# Patient Record
Sex: FEMALE | Race: WHITE | Employment: OTHER | URBAN - NONMETROPOLITAN AREA
[De-identification: names, ages, dates, MRNs, and addresses within clinical notes are randomized per-mention and may not be internally consistent; named-entity substitution may affect disease eponyms.]

---

## 2018-05-21 ENCOUNTER — OFFICE VISIT (OUTPATIENT)
Dept: PULMONOLOGY | Age: 73
End: 2018-05-21
Payer: MEDICARE

## 2018-05-21 VITALS
RESPIRATION RATE: 15 BRPM | DIASTOLIC BLOOD PRESSURE: 66 MMHG | OXYGEN SATURATION: 94 % | WEIGHT: 213.2 LBS | SYSTOLIC BLOOD PRESSURE: 102 MMHG | BODY MASS INDEX: 33.46 KG/M2 | HEIGHT: 67 IN | TEMPERATURE: 97.8 F | HEART RATE: 68 BPM

## 2018-05-21 DIAGNOSIS — J96.11 CHRONIC HYPOXEMIC RESPIRATORY FAILURE (HCC): ICD-10-CM

## 2018-05-21 DIAGNOSIS — J44.9 STAGE 3 SEVERE COPD BY GOLD CLASSIFICATION (HCC): Primary | ICD-10-CM

## 2018-05-21 DIAGNOSIS — R91.1 LUNG NODULE: ICD-10-CM

## 2018-05-21 PROCEDURE — G8427 DOCREV CUR MEDS BY ELIG CLIN: HCPCS | Performed by: INTERNAL MEDICINE

## 2018-05-21 PROCEDURE — 94618 PULMONARY STRESS TESTING: CPT | Performed by: INTERNAL MEDICINE

## 2018-05-21 PROCEDURE — 4040F PNEUMOC VAC/ADMIN/RCVD: CPT | Performed by: INTERNAL MEDICINE

## 2018-05-21 PROCEDURE — 3017F COLORECTAL CA SCREEN DOC REV: CPT | Performed by: INTERNAL MEDICINE

## 2018-05-21 PROCEDURE — 99204 OFFICE O/P NEW MOD 45 MIN: CPT | Performed by: INTERNAL MEDICINE

## 2018-05-21 PROCEDURE — 1123F ACP DISCUSS/DSCN MKR DOCD: CPT | Performed by: INTERNAL MEDICINE

## 2018-05-21 PROCEDURE — G8417 CALC BMI ABV UP PARAM F/U: HCPCS | Performed by: INTERNAL MEDICINE

## 2018-05-21 PROCEDURE — G8926 SPIRO NO PERF OR DOC: HCPCS | Performed by: INTERNAL MEDICINE

## 2018-05-21 PROCEDURE — 1036F TOBACCO NON-USER: CPT | Performed by: INTERNAL MEDICINE

## 2018-05-21 PROCEDURE — 3023F SPIROM DOC REV: CPT | Performed by: INTERNAL MEDICINE

## 2018-05-21 PROCEDURE — 1090F PRES/ABSN URINE INCON ASSESS: CPT | Performed by: INTERNAL MEDICINE

## 2018-05-21 PROCEDURE — G8400 PT W/DXA NO RESULTS DOC: HCPCS | Performed by: INTERNAL MEDICINE

## 2018-05-21 RX ORDER — ALBUTEROL SULFATE 1.25 MG/3ML
1 SOLUTION RESPIRATORY (INHALATION) EVERY 6 HOURS PRN
Qty: 360 ML | Refills: 11 | Status: SHIPPED | OUTPATIENT
Start: 2018-05-21 | End: 2019-06-03 | Stop reason: ALTCHOICE

## 2018-05-21 RX ORDER — ALBUTEROL SULFATE 1.25 MG/3ML
1 SOLUTION RESPIRATORY (INHALATION) EVERY 6 HOURS PRN
COMMUNITY
End: 2018-05-21 | Stop reason: SDUPTHER

## 2018-05-21 RX ORDER — ATORVASTATIN CALCIUM 40 MG/1
40 TABLET, FILM COATED ORAL DAILY
COMMUNITY
End: 2020-07-15

## 2018-05-21 RX ORDER — RAMIPRIL 10 MG/1
10 CAPSULE ORAL DAILY
COMMUNITY

## 2018-05-21 ASSESSMENT — ENCOUNTER SYMPTOMS
PHOTOPHOBIA: 0
SINUS PRESSURE: 0
CONSTIPATION: 0
RHINORRHEA: 0
VOICE CHANGE: 0
FACIAL SWELLING: 0
COUGH: 0
SHORTNESS OF BREATH: 1
VOMITING: 0
STRIDOR: 0
CHEST TIGHTNESS: 0
ABDOMINAL PAIN: 0
WHEEZING: 0
ABDOMINAL DISTENTION: 0
BACK PAIN: 0
APNEA: 0
BLOOD IN STOOL: 0
CHOKING: 0
NAUSEA: 0

## 2018-05-24 DIAGNOSIS — R91.1 LUNG NODULE: ICD-10-CM

## 2018-05-24 DIAGNOSIS — J44.9 STAGE 3 SEVERE COPD BY GOLD CLASSIFICATION (HCC): ICD-10-CM

## 2018-05-31 ENCOUNTER — TELEPHONE (OUTPATIENT)
Dept: PULMONOLOGY | Age: 73
End: 2018-05-31

## 2018-06-07 ENCOUNTER — TELEPHONE (OUTPATIENT)
Dept: PULMONOLOGY | Age: 73
End: 2018-06-07

## 2018-06-07 DIAGNOSIS — J44.9 STAGE 3 SEVERE COPD BY GOLD CLASSIFICATION (HCC): ICD-10-CM

## 2018-06-13 ENCOUNTER — TELEPHONE (OUTPATIENT)
Dept: PULMONOLOGY | Age: 73
End: 2018-06-13

## 2018-06-13 NOTE — TELEPHONE ENCOUNTER
Pt called and is mad. She never received my message on the Duoneb, she has been on this for years with her Spiriva and Sada Taylor will not ship the Albuterol beause of her HX. She will be out and says will end up in hospital.  Please advise.  I am also calling Sada Taylor

## 2018-06-26 NOTE — TELEPHONE ENCOUNTER
Called pt to make sure she received Albuterol and that she is doing ok. Received and has used for a day. So far she is good and josé miguel call if any problems.

## 2018-07-11 ENCOUNTER — TELEPHONE (OUTPATIENT)
Dept: PULMONOLOGY | Age: 73
End: 2018-07-11

## 2018-07-11 NOTE — TELEPHONE ENCOUNTER
Pt called and has been using the Albuterol nebulizer soulution. She had always been on Ipratropium/Albuterol for years and since making this change is breathless. Cannot even walk to bathroom from sewing room without being out of breath,. Never had this problem when on Duoneb. .  Wants to go back to it. Has NEVER been like this before and has lost 30 in 3 months. She uses [facility] for this medication. Please reconsider giving this medicine to her.

## 2018-07-12 DIAGNOSIS — J44.9 CHRONIC OBSTRUCTIVE PULMONARY DISEASE, UNSPECIFIED COPD TYPE (HCC): Primary | ICD-10-CM

## 2018-07-12 RX ORDER — IPRATROPIUM BROMIDE AND ALBUTEROL SULFATE 2.5; .5 MG/3ML; MG/3ML
1 SOLUTION RESPIRATORY (INHALATION) EVERY 4 HOURS PRN
Qty: 360 VIAL | Refills: 5 | Status: SHIPPED | OUTPATIENT
Start: 2018-07-12 | End: 2019-06-03

## 2018-08-16 ENCOUNTER — TELEPHONE (OUTPATIENT)
Dept: PULMONOLOGY | Age: 73
End: 2018-08-16

## 2019-06-03 ENCOUNTER — OFFICE VISIT (OUTPATIENT)
Dept: PULMONOLOGY | Age: 74
End: 2019-06-03
Payer: MEDICARE

## 2019-06-03 VITALS
TEMPERATURE: 97.8 F | WEIGHT: 209.6 LBS | HEIGHT: 67 IN | HEART RATE: 58 BPM | SYSTOLIC BLOOD PRESSURE: 111 MMHG | RESPIRATION RATE: 18 BRPM | BODY MASS INDEX: 32.9 KG/M2 | DIASTOLIC BLOOD PRESSURE: 70 MMHG | OXYGEN SATURATION: 93 %

## 2019-06-03 DIAGNOSIS — R06.02 SOB (SHORTNESS OF BREATH): ICD-10-CM

## 2019-06-03 DIAGNOSIS — J96.11 CHRONIC RESPIRATORY FAILURE WITH HYPOXIA (HCC): ICD-10-CM

## 2019-06-03 DIAGNOSIS — J44.9 STAGE 3 SEVERE COPD BY GOLD CLASSIFICATION (HCC): Primary | ICD-10-CM

## 2019-06-03 PROCEDURE — 4040F PNEUMOC VAC/ADMIN/RCVD: CPT | Performed by: NURSE PRACTITIONER

## 2019-06-03 PROCEDURE — G8427 DOCREV CUR MEDS BY ELIG CLIN: HCPCS | Performed by: NURSE PRACTITIONER

## 2019-06-03 PROCEDURE — 1090F PRES/ABSN URINE INCON ASSESS: CPT | Performed by: NURSE PRACTITIONER

## 2019-06-03 PROCEDURE — 1123F ACP DISCUSS/DSCN MKR DOCD: CPT | Performed by: NURSE PRACTITIONER

## 2019-06-03 PROCEDURE — 94618 PULMONARY STRESS TESTING: CPT | Performed by: NURSE PRACTITIONER

## 2019-06-03 PROCEDURE — 3023F SPIROM DOC REV: CPT | Performed by: NURSE PRACTITIONER

## 2019-06-03 PROCEDURE — G8417 CALC BMI ABV UP PARAM F/U: HCPCS | Performed by: NURSE PRACTITIONER

## 2019-06-03 PROCEDURE — G8926 SPIRO NO PERF OR DOC: HCPCS | Performed by: NURSE PRACTITIONER

## 2019-06-03 PROCEDURE — 3017F COLORECTAL CA SCREEN DOC REV: CPT | Performed by: NURSE PRACTITIONER

## 2019-06-03 PROCEDURE — G8400 PT W/DXA NO RESULTS DOC: HCPCS | Performed by: NURSE PRACTITIONER

## 2019-06-03 PROCEDURE — 1036F TOBACCO NON-USER: CPT | Performed by: NURSE PRACTITIONER

## 2019-06-03 PROCEDURE — 99214 OFFICE O/P EST MOD 30 MIN: CPT | Performed by: NURSE PRACTITIONER

## 2019-06-03 RX ORDER — ALBUTEROL SULFATE 90 UG/1
2 AEROSOL, METERED RESPIRATORY (INHALATION) 4 TIMES DAILY PRN
Qty: 1 INHALER | Refills: 11 | Status: SHIPPED | OUTPATIENT
Start: 2019-06-03 | End: 2019-09-11 | Stop reason: SDUPTHER

## 2019-06-03 RX ORDER — ALBUTEROL SULFATE 2.5 MG/3ML
2.5 SOLUTION RESPIRATORY (INHALATION) EVERY 6 HOURS PRN
Qty: 360 VIAL | Refills: 11 | Status: SHIPPED | OUTPATIENT
Start: 2019-06-03 | End: 2019-09-11

## 2019-06-03 NOTE — PROGRESS NOTES
[facility]    Patient: Ted Alonso, 69 y.o.   : 1945  6/3/2019    Pt of Dr. Soledad Newell   Patient presents with    Follow-up     1 year COPD follow up. SHIVA Zaidi is here for 1 year follow up for severe COPD, FEV1 37% in 2017. She has a 35 pack year smoking history quitting in  along with factory work environmental exposures. She followed with Dr. Bob Dumas for years. She is on Advair, Spiriva and is using DuoNeb 2 times daily (at her request as she had no benefit with Albuterol alone). She is to be using home 02 at 4 Liters with activity and sleep. Uses 2 Liters with sleep and exertional activity only. Was treated for CAP in April while residing in [place]. Reports having a CXR and CT of chest at that time. CT was done to follow up on possible nodule vs scarring and was told not concerning (no reports). Previous CXR's done at [facility] questioned nodule as well, last repeat a year ago reported no nodules (no films). Presents feeling more GARCÍA over the last 6 months. Also has a history of MI years ago, no stenting. Has not followed with Dr. Mateus Romeo and is due to see next month. Lost 50 lbs before going to [place] for winter, gained 20 lbs back. SOB did not improve with weight loss. Reports having normal sleep study in the past. Son and grandson both with asthma. .      Progress History:   Since last visit any new medical issues? No  New ER or hospitlal visits? No  Any new or changes in medicines? No  Using inhalers? Yes Spiriva and Advair  Are they helpful? Not sure, has been on for years.   Past Medical hx   PMH:  Past Medical History:   Diagnosis Date    COPD (chronic obstructive pulmonary disease) ([address]     HLD (hyperlipidemia)     HTN (hypertension)     Myocardial infarction ([address]      SURGICAL HISTORY:  Past Surgical History:   Procedure Laterality Date    BLADDER SUSPENSION      CATARACT REMOVAL     

## 2019-09-03 ENCOUNTER — HOSPITAL ENCOUNTER (OUTPATIENT)
Dept: PULMONOLOGY | Age: 74
Discharge: HOME | End: 2019-09-03
Payer: MEDICARE

## 2019-09-03 DIAGNOSIS — J44.9 STAGE 3 SEVERE COPD BY GOLD CLASSIFICATION (HCC): ICD-10-CM

## 2019-09-03 PROCEDURE — 94060 EVALUATION OF WHEEZING: CPT

## 2019-09-11 ENCOUNTER — OFFICE VISIT (OUTPATIENT)
Dept: PULMONOLOGY | Age: 74
End: 2019-09-11
Payer: MEDICARE

## 2019-09-11 ENCOUNTER — TELEPHONE (OUTPATIENT)
Dept: PULMONOLOGY | Age: 74
End: 2019-09-11

## 2019-09-11 VITALS
HEIGHT: 66 IN | TEMPERATURE: 98.9 F | BODY MASS INDEX: 34.72 KG/M2 | DIASTOLIC BLOOD PRESSURE: 76 MMHG | OXYGEN SATURATION: 94 % | SYSTOLIC BLOOD PRESSURE: 118 MMHG | HEART RATE: 71 BPM | WEIGHT: 216 LBS

## 2019-09-11 DIAGNOSIS — J96.11 CHRONIC RESPIRATORY FAILURE WITH HYPOXIA (HCC): ICD-10-CM

## 2019-09-11 DIAGNOSIS — J84.9 ILD (INTERSTITIAL LUNG DISEASE) (HCC): ICD-10-CM

## 2019-09-11 DIAGNOSIS — J44.9 STAGE 3 SEVERE COPD BY GOLD CLASSIFICATION (HCC): Primary | ICD-10-CM

## 2019-09-11 DIAGNOSIS — R06.02 SOB (SHORTNESS OF BREATH): ICD-10-CM

## 2019-09-11 DIAGNOSIS — I25.2 HISTORY OF MI (MYOCARDIAL INFARCTION): ICD-10-CM

## 2019-09-11 PROCEDURE — G8400 PT W/DXA NO RESULTS DOC: HCPCS | Performed by: NURSE PRACTITIONER

## 2019-09-11 PROCEDURE — 3023F SPIROM DOC REV: CPT | Performed by: NURSE PRACTITIONER

## 2019-09-11 PROCEDURE — G8427 DOCREV CUR MEDS BY ELIG CLIN: HCPCS | Performed by: NURSE PRACTITIONER

## 2019-09-11 PROCEDURE — G8417 CALC BMI ABV UP PARAM F/U: HCPCS | Performed by: NURSE PRACTITIONER

## 2019-09-11 PROCEDURE — 4040F PNEUMOC VAC/ADMIN/RCVD: CPT | Performed by: NURSE PRACTITIONER

## 2019-09-11 PROCEDURE — 1090F PRES/ABSN URINE INCON ASSESS: CPT | Performed by: NURSE PRACTITIONER

## 2019-09-11 PROCEDURE — G8926 SPIRO NO PERF OR DOC: HCPCS | Performed by: NURSE PRACTITIONER

## 2019-09-11 PROCEDURE — 3017F COLORECTAL CA SCREEN DOC REV: CPT | Performed by: NURSE PRACTITIONER

## 2019-09-11 PROCEDURE — 1036F TOBACCO NON-USER: CPT | Performed by: NURSE PRACTITIONER

## 2019-09-11 PROCEDURE — 99214 OFFICE O/P EST MOD 30 MIN: CPT | Performed by: NURSE PRACTITIONER

## 2019-09-11 PROCEDURE — 1123F ACP DISCUSS/DSCN MKR DOCD: CPT | Performed by: NURSE PRACTITIONER

## 2019-09-11 RX ORDER — IPRATROPIUM BROMIDE AND ALBUTEROL SULFATE 2.5; .5 MG/3ML; MG/3ML
1 SOLUTION RESPIRATORY (INHALATION) EVERY 4 HOURS
Qty: 360 ML | Refills: 11 | Status: SHIPPED | OUTPATIENT
Start: 2019-09-11 | End: 2019-09-12 | Stop reason: SDUPTHER

## 2019-09-11 RX ORDER — ALBUTEROL SULFATE 90 UG/1
2 AEROSOL, METERED RESPIRATORY (INHALATION) 4 TIMES DAILY PRN
Qty: 1 INHALER | Refills: 11 | Status: SHIPPED | OUTPATIENT
Start: 2019-09-11

## 2019-09-11 NOTE — TELEPHONE ENCOUNTER
Eloisa called from [facility] and Pts script for Duoneb that was handwritten says every 6 hrs prn. Medicare and has to be 4 hours but no PRN, she has one on file good until June from  Dr Nicky Blackman if this is oK?

## 2019-09-12 RX ORDER — IPRATROPIUM BROMIDE AND ALBUTEROL SULFATE 2.5; .5 MG/3ML; MG/3ML
1 SOLUTION RESPIRATORY (INHALATION) 4 TIMES DAILY
Qty: 360 ML | Refills: 11 | Status: SHIPPED | OUTPATIENT
Start: 2019-09-12 | End: 2020-07-15 | Stop reason: ALTCHOICE

## 2019-09-13 DIAGNOSIS — R06.02 SOB (SHORTNESS OF BREATH): ICD-10-CM

## 2019-09-13 DIAGNOSIS — I25.2 HISTORY OF MI (MYOCARDIAL INFARCTION): ICD-10-CM

## 2019-09-13 DIAGNOSIS — J44.9 STAGE 3 SEVERE COPD BY GOLD CLASSIFICATION (HCC): ICD-10-CM

## 2020-07-15 ENCOUNTER — OFFICE VISIT (OUTPATIENT)
Dept: PULMONOLOGY | Age: 75
End: 2020-07-15
Payer: MEDICARE

## 2020-07-15 VITALS
OXYGEN SATURATION: 93 % | HEIGHT: 66 IN | SYSTOLIC BLOOD PRESSURE: 118 MMHG | WEIGHT: 220.2 LBS | BODY MASS INDEX: 35.39 KG/M2 | DIASTOLIC BLOOD PRESSURE: 68 MMHG | TEMPERATURE: 97.3 F | HEART RATE: 68 BPM

## 2020-07-15 PROCEDURE — G8427 DOCREV CUR MEDS BY ELIG CLIN: HCPCS | Performed by: NURSE PRACTITIONER

## 2020-07-15 PROCEDURE — 1123F ACP DISCUSS/DSCN MKR DOCD: CPT | Performed by: NURSE PRACTITIONER

## 2020-07-15 PROCEDURE — G8926 SPIRO NO PERF OR DOC: HCPCS | Performed by: NURSE PRACTITIONER

## 2020-07-15 PROCEDURE — 1036F TOBACCO NON-USER: CPT | Performed by: NURSE PRACTITIONER

## 2020-07-15 PROCEDURE — G8400 PT W/DXA NO RESULTS DOC: HCPCS | Performed by: NURSE PRACTITIONER

## 2020-07-15 PROCEDURE — G8417 CALC BMI ABV UP PARAM F/U: HCPCS | Performed by: NURSE PRACTITIONER

## 2020-07-15 PROCEDURE — 99214 OFFICE O/P EST MOD 30 MIN: CPT | Performed by: NURSE PRACTITIONER

## 2020-07-15 PROCEDURE — 4040F PNEUMOC VAC/ADMIN/RCVD: CPT | Performed by: NURSE PRACTITIONER

## 2020-07-15 PROCEDURE — 1090F PRES/ABSN URINE INCON ASSESS: CPT | Performed by: NURSE PRACTITIONER

## 2020-07-15 PROCEDURE — 3023F SPIROM DOC REV: CPT | Performed by: NURSE PRACTITIONER

## 2020-07-15 PROCEDURE — 3017F COLORECTAL CA SCREEN DOC REV: CPT | Performed by: NURSE PRACTITIONER

## 2020-07-15 RX ORDER — ASCORBIC ACID 500 MG
500 TABLET ORAL DAILY
COMMUNITY

## 2020-07-15 RX ORDER — CYANOCOBALAMIN (VITAMIN B-12) 1000 MCG
1 TABLET, EXTENDED RELEASE ORAL 2 TIMES DAILY WITH MEALS
COMMUNITY

## 2020-07-15 RX ORDER — BIOTIN 5 MG
TABLET ORAL
COMMUNITY

## 2020-07-15 RX ORDER — MULTIVITAMIN WITH IRON
TABLET ORAL
COMMUNITY

## 2020-07-15 RX ORDER — NITROGLYCERIN 2.5 MG/1
2.5 CAPSULE ORAL 2 TIMES DAILY
COMMUNITY

## 2020-07-15 RX ORDER — GABAPENTIN 600 MG/1
600 TABLET ORAL 3 TIMES DAILY
COMMUNITY

## 2020-07-15 RX ORDER — ASPIRIN 81 MG/1
81 TABLET, CHEWABLE ORAL DAILY
COMMUNITY

## 2020-07-15 RX ORDER — DULOXETIN HYDROCHLORIDE 60 MG/1
60 CAPSULE, DELAYED RELEASE ORAL DAILY
COMMUNITY

## 2020-07-15 NOTE — PROGRESS NOTES
[facility]     Patient: Odette Osorio, 70 y.o.   : 1945  7/15/2020   Pt of Dr. Gladis Castillo   Patient presents with    Follow-up     COPD 7 month follow up with a overnight pulse ox         HPI  Galina Vital is here for 6 month follow up for severe COPD. 35 pack year smoking history quitting in  along with factory work environmental exposures.    FEV1 38-49 (PD), FEV/FVC 46. A1A MM. Home 02 at 3 Liters with activity and sleep. Question of lung nodule in the past, obtained imaging reports when in [place] showing scarring/ILD with emphysema. Reports having had normal sleep study in the past.   When last seen, was not complying with 02. Has since obtained POC and used all winter in [place]. Was set up on 3 Liters with pulse dose by DME without a walk for unclear reasons. Nocturnal 02 increased from 2 to 3 Liters, never received overnight pulsox results, reports being done. Continues on Advair and Spiriva (did not like Trelegy). No increased VIKY use. Prefers DuoNeb over albuterol alone, not using. Obtained echo done 19 with normal EF, grade I DD, normal RV size/function, RVSP 41 mmHg. Reports progression of SOB. Concerned she is not getting enough oxygen with POC. Increased fatigue, napping. MMRC Score: 2-3    Progress History:   Since last visit any new medical issues? No  New ER or hospitlal visits? No  Any new or changes in medicines? No  Using inhalers? Yes   Are they helpful?  Yes     Past Medical hx   PMH:  Past Medical History:   Diagnosis Date    [facility]     HLD (hyperlipidemia)     HTN (hypertension)     Myocardial infarction ([facility]      SURGICAL HISTORY:  Past Surgical History:   Procedure Laterality Date    BLADDER SUSPENSION      CATARACT REMOVAL      CHOLECYSTECTOMY      FOOT SURGERY      HERNIA REPAIR      PARTIAL HYSTERECTOMY       SOCIAL HISTORY:  Social History     Tobacco Use    Smoking status: Former Smoker     Packs/day: 1.00     Years: 35.00     Pack years: 35.00     Types: Cigarettes     Start date:      Last attempt to quit:      Years since quittin.5    Smokeless tobacco: Never Used   Substance Use Topics    Alcohol use: Not on file    Drug use: Not on file     ALLERGIES:  Allergies   Allergen Reactions    Bee Venom     Codeine Swelling    Penicillins      As a child    Sulfa Antibiotics      As a child     FAMILY HISTORY:  Family History   Problem Relation Age of Onset    Alcohol Abuse Mother 43         from a fall    Other Father 40         accident    Diabetes Maternal Grandmother     Lung Cancer Maternal Grandfather         + smoker     CURRENT MEDICATIONS:  Current Outpatient Medications   Medication Sig Dispense Refill    MULTIPLE VITAMIN PO Take by mouth      Acetaminophen (TYLENOL ARTHRITIS PAIN PO) Take by mouth 2 times daily      calcium citrate-vitamin D (CITRICAL + D) 315-250 MG-UNIT TABS per tablet Take 1 tablet by mouth 2 times daily (with meals)      Biotin 5 MG TABS Take by mouth      Vitamin A 2400 MCG (8000 UT) CAPS Take by mouth      vitamin C (ASCORBIC ACID) 500 MG tablet Take 500 mg by mouth daily      nitroGLYCERIN 2.5 MG extended release capsule Take 2.5 mg by mouth 2 times daily      gabapentin (NEURONTIN) 600 MG tablet Take 600 mg by mouth 3 times daily.       DULoxetine (CYMBALTA) 60 MG extended release capsule Take 60 mg by mouth daily      aspirin 81 MG chewable tablet Take 81 mg by mouth daily      sertraline (ZOLOFT) 50 MG tablet Take 50 mg by mouth daily      tiotropium (SPIRIVA HANDIHALER) 18 MCG inhalation capsule Inhale 1 capsule into the lungs daily 3 capsule 3    fluticasone-salmeterol (ADVAIR) 250-50 MCG/DOSE AEPB Inhale 1 puff into the lungs every 12 hours 3 Inhaler 3    albuterol sulfate  (90 Base) MCG/ACT inhaler Inhale 2 puffs into the lungs 4 times daily as needed for Wheezing 1 Inhaler 11    ramipril (ALTACE) 10 MG capsule Take 10 mg by mouth daily      metoprolol tartrate (LOPRESSOR) 25 MG tablet Take 25 mg by mouth 2 times daily       No current facility-administered medications for this visit. Inge GARCIA   General/Constitutional: No recent loss of weight or appetite changes. No fever or chills. HENT: Negative. Eyes: Negative. Upper respiratory tract: No nasal stuffiness or post nasal drip. Lower respiratory tract/ lungs: No cough or sputum production. No hemoptysis. Cardiovascular: No palpitations or chest pain. Gastrointestinal: No nausea or vomiting. Neurological: No focal neurologiacal weakness. Extremities: No edema. Musculoskeletal: No complaints. Genitourinary: No complaints. Hematological: Negative. Psychiatric/Behavioral: Negative. Skin: No itching. Physical exam   /68 (Site: Left Lower Arm, Position: Sitting, Cuff Size: Medium Adult)   Pulse 68   Temp 97.3 °F (36.3 °C) (Tympanic)   Ht 5' 6\" (1.676 m)   Wt 220 lb 3.2 oz (99.9 kg)   SpO2 93% Comment: on room air at rest  BMI 35.54 kg/m²      Constitutional: Patient appears moderately built and moderately nourished in no acute distress. Head: Normocephalic and atraumatic. Mouth/Throat: Oropharynx is clear and moist. No oral thrush. Eyes: Conjunctivae are normal. Pupils are equal, round, and reactive to light. No scleral icterus. Neck: Neck supple. No JVD or tracheal deviation present. Cardiovascular: Regular rate, regular rhythm, S1 and S2 with no murmur. No peripheral edema. Pulmonary/Chest: Normal effort with diminished but clear breath sounds. No wheezing, rales or rhonchi. Normal AP diameter. No stridor. No respiratory distress. Abdominal: Soft. Bowel sounds audible. No distension or tenderness to palpation. Musculoskeletal: Moves all extremities. Lymphadenopathy: No cervical adenopathy. Neurological: Patient is alert and oriented to person, place, and time. No focal deficits. Skin: Skin is warm and dry. No clubbing, no cyanosis. Test results   Lung Nodule Screening     [] Qualifies    [x]Does not qualify   [] Declined    [] Completed                               Assessment      Diagnosis Orders   1. Stage 3 severe COPD by GOLD classification ([address]  Spirometry With Bronchodilator    DME Order for Home Oxygen as OP   2. Chronic respiratory failure with hypoxia (HCC)     3. Shortness of breath     4. Pulmonary artery hypertension (HCC)      Mild   5. Hypersomnia     6. Fatigue, unspecified type           Plan   Continue Spiriva and Advair with VIKY PRN. Will schedule 6 minute walk on POC. Continue 3 Liters continuous for now (5 Liters with POC). Increase to 4 Liters with sleep. Obtain sleep study results. Consider repeat. Repeat Spirometry. Re-assess SOB with increase 02 with POC at follow up. PCP follow up for vaccines. Follow up Pulmonary Rehab.     Will see Irma Kenney back in: 3 months.     SARA Connell, ACNP-C  7/15/2020

## 2020-07-22 ENCOUNTER — OFFICE VISIT (OUTPATIENT)
Dept: PULMONOLOGY | Age: 75
End: 2020-07-22
Payer: MEDICARE

## 2020-07-22 VITALS
DIASTOLIC BLOOD PRESSURE: 78 MMHG | HEART RATE: 62 BPM | TEMPERATURE: 97.5 F | WEIGHT: 222 LBS | OXYGEN SATURATION: 94 % | SYSTOLIC BLOOD PRESSURE: 124 MMHG | BODY MASS INDEX: 34.84 KG/M2 | HEIGHT: 67 IN

## 2020-07-22 PROCEDURE — 94618 PULMONARY STRESS TESTING: CPT | Performed by: NURSE PRACTITIONER

## 2020-07-22 PROCEDURE — 99215 OFFICE O/P EST HI 40 MIN: CPT | Performed by: NURSE PRACTITIONER

## 2020-07-22 PROCEDURE — G8926 SPIRO NO PERF OR DOC: HCPCS | Performed by: NURSE PRACTITIONER

## 2020-07-22 PROCEDURE — 3023F SPIROM DOC REV: CPT | Performed by: NURSE PRACTITIONER

## 2020-07-22 PROCEDURE — G8417 CALC BMI ABV UP PARAM F/U: HCPCS | Performed by: NURSE PRACTITIONER

## 2020-07-22 PROCEDURE — G8427 DOCREV CUR MEDS BY ELIG CLIN: HCPCS | Performed by: NURSE PRACTITIONER

## 2020-07-22 PROCEDURE — 3017F COLORECTAL CA SCREEN DOC REV: CPT | Performed by: NURSE PRACTITIONER

## 2020-07-22 PROCEDURE — 1036F TOBACCO NON-USER: CPT | Performed by: NURSE PRACTITIONER

## 2020-07-22 PROCEDURE — 1090F PRES/ABSN URINE INCON ASSESS: CPT | Performed by: NURSE PRACTITIONER

## 2020-07-22 PROCEDURE — 4040F PNEUMOC VAC/ADMIN/RCVD: CPT | Performed by: NURSE PRACTITIONER

## 2020-07-22 PROCEDURE — 1123F ACP DISCUSS/DSCN MKR DOCD: CPT | Performed by: NURSE PRACTITIONER

## 2020-07-22 PROCEDURE — G8400 PT W/DXA NO RESULTS DOC: HCPCS | Performed by: NURSE PRACTITIONER

## 2020-07-22 RX ORDER — ALBUTEROL SULFATE 2.5 MG/3ML
2.5 SOLUTION RESPIRATORY (INHALATION) EVERY 6 HOURS PRN
Qty: 1 PACKAGE | Refills: 3 | Status: SHIPPED | OUTPATIENT
Start: 2020-07-22 | End: 2021-07-22

## 2020-07-22 NOTE — PROGRESS NOTES
Smokeless tobacco: Never Used   Substance Use Topics    Alcohol use: Not on file    Drug use: Not on file     ALLERGIES:  Allergies   Allergen Reactions    Bee Venom     Codeine Swelling    Penicillins      As a child    Sulfa Antibiotics      As a child     FAMILY HISTORY:  Family History   Problem Relation Age of Onset    Alcohol Abuse Mother 43         from a fall    Other Father 40         accident    Diabetes Maternal Grandmother     Lung Cancer Maternal Grandfather         + smoker     CURRENT MEDICATIONS:  Current Outpatient Medications   Medication Sig Dispense Refill    MULTIPLE VITAMIN PO Take by mouth      Acetaminophen (TYLENOL ARTHRITIS PAIN PO) Take by mouth 2 times daily      calcium citrate-vitamin D (CITRICAL + D) 315-250 MG-UNIT TABS per tablet Take 1 tablet by mouth 2 times daily (with meals)      Biotin 5 MG TABS Take by mouth      Vitamin A 2400 MCG (8000 UT) CAPS Take by mouth      vitamin C (ASCORBIC ACID) 500 MG tablet Take 500 mg by mouth daily      nitroGLYCERIN 2.5 MG extended release capsule Take 2.5 mg by mouth 2 times daily      gabapentin (NEURONTIN) 600 MG tablet Take 600 mg by mouth 3 times daily.       DULoxetine (CYMBALTA) 60 MG extended release capsule Take 60 mg by mouth daily      aspirin 81 MG chewable tablet Take 81 mg by mouth daily      sertraline (ZOLOFT) 50 MG tablet Take 50 mg by mouth daily      tiotropium (SPIRIVA HANDIHALER) 18 MCG inhalation capsule Inhale 1 capsule into the lungs daily 3 capsule 3    fluticasone-salmeterol (ADVAIR) 250-50 MCG/DOSE AEPB Inhale 1 puff into the lungs every 12 hours 3 Inhaler 3    albuterol sulfate  (90 Base) MCG/ACT inhaler Inhale 2 puffs into the lungs 4 times daily as needed for Wheezing 1 Inhaler 11    ramipril (ALTACE) 10 MG capsule Take 10 mg by mouth daily      metoprolol tartrate (LOPRESSOR) 25 MG tablet Take 25 mg by mouth 2 times daily       No current facility-administered medications Completed                                                           Assessment      Diagnosis Orders   1. Stage 3 severe COPD by GOLD classification (HCC)  6 Minute Walk Test    DME Order for Home Oxygen as OP    Full PFT Study With Bronchodilator    CT CHEST HIGH RESOLUTION    albuterol (PROVENTIL) (2.5 MG/3ML) 0.083% nebulizer solution   2. Chronic respiratory failure with hypoxia (HCC)     3. Abnormal CT of the chest      Emphysema  ILD of bases   4. Shortness of breath     5. Adverse effect of drug, sequela     6. Hypersomnia     7. Snoring  Baseline Diagnostic Sleep Study         Plan           Six Minute Walk Test  Melissa Cates 1945    Six minute walk test done in my office today by my medical assistant. Wendy's oxygen saturation at rest on room air was 87%. The six minute walk test was started with oxygen supplementation using her home Inogen. Oxygen supplimentation was started with 1 LPM via nasal cannula and titrated to 5 LPM via nasal cannula. At the end of the test [facility] oxygen saturation remained at 88% on 5 LPM with exertion. Patient ambulated a total of 864 feet with a Erica score of 7, no events. Resting heart rate was 58 and 79 upon completion of the walk. Nasal Oxygen order:  1-4 lpm to be used with:  She no longer qualifies for POC: Needs continuous flow with portable tanks as before. 1 Liter with rest.  3-4 Liters with walking and continuous flow. 4 Liters with Sleep. Continuous Yes    DME Medical Necessity Documentation    Mars Him was seen in the office on 7/22/2020 for the diagnosis COPD. I am prescribing oxygen because the diagnosis and testing requires the patient to have oxygen in the home.  her condition will improve or be benefited by oxygen use. The patient is able to perform good mobility in a home setting and therefore does require the use of a portable oxygen system. Stop Inogen. Order placed to resume portable tanks.  To use 3-4 Liters with activity. She has a Simply Go (that she purchased on her own to have portable continuous flow while traveling to [place]). I reviewed old sleep study findings: 2017 AHI 4.9 (Supine 9.2, normal of left/right side, 173.6 minutes < 88%. She requests to be re-evaluated as she primarily sleeps supine, partner has noted witnessed apneas with progression of 02 desaturation on overnight pulsox and progression of symptoms. With progression of SOB, prior CT reports indicating findings of ILD, will repeat full PFT and HRCT of chest.  Continue Spiriva and Advair. Stop Duoneb. Try Albuterol alone. If persist, consider Xopenex. Inform PCP of angioedema. Could be ACEi. Has completed Pulmonary Rehab. Did before going to [facility], had to stop early tho, not able to resume. PCP follow up for vaccines. >40 minutes was spent on this visit with > 50% being face to face obtaining HPI, examining patient, reviewing test results, educating about disease process, monitoring testing/discussing results (6 minute walk) and coordinating a treatment plan. Will see Mirtha Jean back in: after testing.     SARA Griffin, ACNP-C  7/22/2020

## 2020-09-02 ENCOUNTER — OFFICE VISIT (OUTPATIENT)
Dept: PULMONOLOGY | Age: 75
End: 2020-09-02
Payer: MEDICARE

## 2020-09-02 VITALS
WEIGHT: 228 LBS | DIASTOLIC BLOOD PRESSURE: 70 MMHG | BODY MASS INDEX: 35.79 KG/M2 | HEART RATE: 68 BPM | OXYGEN SATURATION: 91 % | TEMPERATURE: 98.4 F | HEIGHT: 67 IN | SYSTOLIC BLOOD PRESSURE: 116 MMHG

## 2020-09-02 PROCEDURE — 3017F COLORECTAL CA SCREEN DOC REV: CPT | Performed by: NURSE PRACTITIONER

## 2020-09-02 PROCEDURE — 1123F ACP DISCUSS/DSCN MKR DOCD: CPT | Performed by: NURSE PRACTITIONER

## 2020-09-02 PROCEDURE — 1090F PRES/ABSN URINE INCON ASSESS: CPT | Performed by: NURSE PRACTITIONER

## 2020-09-02 PROCEDURE — 1036F TOBACCO NON-USER: CPT | Performed by: NURSE PRACTITIONER

## 2020-09-02 PROCEDURE — G8427 DOCREV CUR MEDS BY ELIG CLIN: HCPCS | Performed by: NURSE PRACTITIONER

## 2020-09-02 PROCEDURE — 3023F SPIROM DOC REV: CPT | Performed by: NURSE PRACTITIONER

## 2020-09-02 PROCEDURE — G8400 PT W/DXA NO RESULTS DOC: HCPCS | Performed by: NURSE PRACTITIONER

## 2020-09-02 PROCEDURE — G8417 CALC BMI ABV UP PARAM F/U: HCPCS | Performed by: NURSE PRACTITIONER

## 2020-09-02 PROCEDURE — 4040F PNEUMOC VAC/ADMIN/RCVD: CPT | Performed by: NURSE PRACTITIONER

## 2020-09-02 PROCEDURE — G8926 SPIRO NO PERF OR DOC: HCPCS | Performed by: NURSE PRACTITIONER

## 2020-09-02 PROCEDURE — 99215 OFFICE O/P EST HI 40 MIN: CPT | Performed by: NURSE PRACTITIONER

## 2020-09-02 RX ORDER — BUDESONIDE AND FORMOTEROL FUMARATE DIHYDRATE 160; 4.5 UG/1; UG/1
2 AEROSOL RESPIRATORY (INHALATION) 2 TIMES DAILY
Qty: 1 INHALER | Refills: 11
Start: 2020-09-02 | End: 2021-09-02

## 2020-09-02 NOTE — PROGRESS NOTES
[facility], [facility] [address], 70 y.o.  [id number]     Patient of Dr. Anil Paz presents to review sleep study results and follow up for COPD  with Chest CT 8/28, PFT 8/26/20 and PSG 8/11/20    Study Results  Initial Study Date -  8/11/20  AHI -  1.9    Total Events - 10  (Apneas  0  Hypopneas 10  Central  0)  LM w/Arousals - 2  Sleep Efficiency - 78.3 % (Total Sleep Time - 320.5 min)  Time with Sats below 88% - 92.20 min    Interval History       Nichole Parker is a 70 y.o. old female who comes in to review the results of her recent sleep study, to answer questions and to explore options for treatment. See below for details of history/last visit. Presents clinically unchanged, at baseline SOB. No new issues since last seen. She no longer is using Inogen and is on portable tanks at 1 Liters with rest, 3-4 Liters with activity, 4 Liters with sleep (no conserving device). Sleep study shows normal AHI. HRCT of chest shows no findings of ILD/fibrosis but with moderate centrilobular emphysema, pleural thickening, old healed rib fractures with scarring and a 7 mm irregular shaped GEORGE nodule, indeterminate finding due to having no prior CT for comparison. Reports from [facility] do not mention nodule, no images to view. She shows no active signs of infection/malignancy. PFT shows progression of emphysema in comparison to 2017, FEV1 43 to 41, FEV/FVC 59 to 48,  to 139,  to 194, DLCO 40 to 48. She continues on Spiriva and Advair, no increased VIKY use. She prefers DuoNeb of Ventolin alone, using 2 times per day. Reported lip/tongue swelling last visit, has not had since (it is very sporadic). Did not like Trelegy. 7/22/20 visit:  Argenis Kang is here for month follow up for severe COPD and for 6 minute walk to be done on Inogen POC.   Recently seen and reported progression of SOB, increased napping and fatigue and was concerned about being set up on Inogen at 3 Liters before leaving for [place] for the winter. She required 3 Liters continuous flow with last 6 minute walk as well as with sleep. When last seen, advised to increase to 5 Liters with POC and 4 Liters with sleep (based on receiving overnight pulsox results). Obtained results of sleep study (she reported having normal one in the past). She previously followed with Dr. Jay De La Torre. 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    Most recent FEV1 38-49 (PD), FEV/FVC 46. A1A MM. Question of lung nodule in the past, obtained imaging reports when in [place] showing scarring/ILD with emphysema.   Continues on Advair and Spiriva (did not like Trelegy).   Prefers DuoNeb over albuterol alone, not using with no increased usage. Echo done 9/12/19 with normal EF, grade I DD, normal RV size/function, RVSP 41 mmHg. Plan:  Stop Inogen. Order placed to resume portable tanks. To use 3-4 Liters with activity. She has a Simply Go (that she purchased on her own to have portable continuous flow while traveling to [place]). I reviewed old sleep study findings: 2017 AHI 4.9 (Supine 9.2, normal of left/right side, 173.6 minutes < 88%. She requests to be re-evaluated as she primarily sleeps supine, partner has noted witnessed apneas with progression of 02 desaturation on overnight pulsox and progression of symptoms. With progression of SOB, prior CT reports indicating findings of ILD, will repeat full PFT and HRCT of chest.  Continue Spiriva and Advair. Stop Duoneb. Try Albuterol alone. If persist, consider Xopenex. Inform PCP of angioedema. Could be ACEi. Has completed Pulmonary Rehab. Did before going to [facility], had to stop early tho, not able to resume.    PCP follow up for vaccines.       MMRC Score: 2-3    PMHx  Past Medical History:   Diagnosis Date    COPD (chronic obstructive pulmonary disease) (HCC)     HLD (hyperlipidemia)     HTN (hypertension)     Myocardial infarction [facility])      Past Surgical History:   Procedure Laterality Date    BLADDER SUSPENSION      CATARACT REMOVAL      CHOLECYSTECTOMY      FOOT SURGERY      HERNIA REPAIR      PARTIAL HYSTERECTOMY       Social History     Tobacco Use    Smoking status: Former Smoker     Packs/day: 1.00     Years: 35.00     Pack years: 35.00     Types: Cigarettes     Start date:      Last attempt to quit:      Years since quittin.6    Smokeless tobacco: Never Used   Substance Use Topics    Alcohol use: Not on file    Drug use: Not on file     Family History   Problem Relation Age of Onset    Alcohol Abuse Mother 43         from a fall    Other Father 40         accident    Diabetes Maternal Grandmother     Lung Cancer Maternal Grandfather         + smoker     Allergies  Allergies   Allergen Reactions    Bee Venom     Codeine Swelling    Penicillins      As a child    Sulfa Antibiotics      As a child     Meds  Current Outpatient Medications   Medication Sig Dispense Refill    albuterol (PROVENTIL) (2.5 MG/3ML) 0.083% nebulizer solution Take 3 mLs by nebulization every 6 hours as needed for Wheezing or Shortness of Breath 1 Package 3    MULTIPLE VITAMIN PO Take by mouth      Acetaminophen (TYLENOL ARTHRITIS PAIN PO) Take by mouth 2 times daily      calcium citrate-vitamin D (CITRICAL + D) 315-250 MG-UNIT TABS per tablet Take 1 tablet by mouth 2 times daily (with meals)      Biotin 5 MG TABS Take by mouth      Vitamin A 2400 MCG (8000 UT) CAPS Take by mouth      vitamin C (ASCORBIC ACID) 500 MG tablet Take 500 mg by mouth daily      nitroGLYCERIN 2.5 MG extended release capsule Take 2.5 mg by mouth 2 times daily      gabapentin (NEURONTIN) 600 MG tablet Take 600 mg by mouth 3 times daily.       DULoxetine (CYMBALTA) 60 MG extended release capsule Take 60 mg by mouth daily      aspirin 81 MG chewable tablet Take 81 mg by mouth daily      sertraline (ZOLOFT) 50 MG tablet Take 50 mg by mouth daily      tiotropium (SPIRIVA HANDIHALER) 18 MCG inhalation capsule Inhale 1 capsule into the lungs daily 3 capsule 3    fluticasone-salmeterol (ADVAIR) 250-50 MCG/DOSE AEPB Inhale 1 puff into the lungs every 12 hours 3 Inhaler 3    albuterol sulfate  (90 Base) MCG/ACT inhaler Inhale 2 puffs into the lungs 4 times daily as needed for Wheezing 1 Inhaler 11    ramipril (ALTACE) 10 MG capsule Take 10 mg by mouth daily      metoprolol tartrate (LOPRESSOR) 25 MG tablet Take 25 mg by mouth 2 times daily       No current facility-administered medications for this visit. ROS  Review of Systems   General/Constitutional: No recent loss of weight or appetite changes. No fever or chills. HENT: Negative. Eyes: Negative. Upper respiratory tract: No nasal stuffiness or post nasal drip. Lower respiratory tract/ lungs: No cough or sputum production. No hemoptysis. Cardiovascular: No palpitations or chest pain. Gastrointestinal: No nausea or vomiting. Neurological: No focal neurologiacal weakness. Extremities: No edema. Musculoskeletal: No complaints. Genitourinary: No complaints. Hematological: Negative. Psychiatric/Behavioral: Negative. Skin: No itching. Exam  /70 (Site: Left Lower Arm, Position: Sitting, Cuff Size: Medium Adult)   Pulse 68   Temp 98.4 °F (36.9 °C) (Tympanic)   Ht 5' 7\" (1.702 m)   Wt 228 lb (103.4 kg)   SpO2 91% Comment: on room air at rest  BMI 35.71 kg/m²    Body mass index is 35.71 kg/m². Oxygen level - RA  Physical Exam   General Appearance - Moderately built, moderately nourished, in no acute distress. HEENT - Head is normocephalic, atraumatic. PERRL. Oral mucosa pink and moist, no oral thrush. Mallampati Score - II (soft palate, uvula, fauces visible). Neck - Supple, symmetrical, trachea midline and soft. Lungs - Clear to auscultation, no wheezes, rales or rhonchi, aeration good.   Cardiovascular - Heart sounds HPI, examining patient, reviewing test results, educating about disease process and coordinating a treatment plan.     Electronically signed by SARA Marte CNP on 9/2/2020 at 5:51 PM

## 2020-09-02 NOTE — PATIENT INSTRUCTIONS
as directed. You may take roflumilast with or without food. Weight loss is a common side effect of roflumilast. While using roflumilast, weigh yourself regularly and tell your doctor if you have any unplanned or unexplained weight loss. Do not share this medicine with another person, even if they have the same symptoms you have. Store at room temperature away from moisture and heat. What happens if I miss a dose? Take the medicine as soon as you can, but skip the missed dose if it is almost time for your next dose. Do not take two doses at one time. What happens if I overdose? Seek emergency medical attention or call the Poison Help line at 1-168.824.6120. What should I avoid while taking roflumilast?  Follow your doctor's instructions about any restrictions on food, beverages, or activity. What are the possible side effects of roflumilast?  Get emergency medical help if you have signs of an allergic reaction: hives; difficult breathing; swelling of your face, lips, tongue, or throat. Report any new or worsening symptoms to your doctor, such as: mood or behavior changes, anxiety, depression, trouble sleeping, or if you feel impulsive, or you have thoughts about suicide or hurting yourself. Call your doctor at once if you have rapid and unintended weight loss. · rapid and unintended weight loss;  · tremors;  · pain or burning when you urinate;  Common side effects may include:  · nausea, diarrhea;  · loss of appetite, minor weight loss;  · headache, dizziness;  · occasional sleep problems;  · back pain; or  · flu symptoms. This is not a complete list of side effects and others may occur. Call your doctor for medical advice about side effects. You may report side effects to FDA at 8-950-CNH-3619. What other drugs will affect roflumilast?  Many drugs can affect roflumilast. This includes prescription and over-the-counter medicines, vitamins, and herbal products.  Not all possible interactions are listed here. Tell your doctor about all your current medicines and any medicine you start or stop using. Where can I get more information? Your pharmacist can provide more information about roflumilast.  Remember, keep this and all other medicines out of the reach of children, never share your medicines with others, and use this medication only for the indication prescribed. Every effort has been made to ensure that the information provided by [address] is accurate, up-to-date, and complete, but no guarantee is made to that effect. Drug information contained herein may be time sensitive. [facility] information has been compiled for use by healthcare practitioners and consumers in the [place] and therefore [facility] does not warrant that uses outside of the [place] are appropriate, unless specifically indicated otherwise. [facility]'s drug information does not endorse drugs, diagnose patients or recommend therapy. [facility]'s drug information is an informational resource designed to assist licensed healthcare practitioners in caring for their patients and/or to serve consumers viewing this service as a supplement to, and not a substitute for, the expertise, skill, knowledge and judgment of healthcare practitioners. The absence of a warning for a given drug or drug combination in no way should be construed to indicate that the drug or drug combination is safe, effective or appropriate for any given patient. [facility] does not assume any responsibility for any aspect of healthcare administered with the aid of information [facility] provides. The information contained herein is not intended to cover all possible uses, directions, precautions, warnings, drug interactions, allergic reactions, or adverse effects. If you have questions about the drugs you are taking, check with your doctor, nurse or pharmacist.  Copyright 3820-3626 [address] [place] [address] Dr SON. Version: 3.02. Revision date: 4/9/2018.   Care instructions adapted under license by [facility] ([facility]). If you have questions about a medical condition or this instruction, always ask your healthcare professional. [address] any warranty or liability for your use of this information.

## 2020-10-01 ENCOUNTER — TELEPHONE (OUTPATIENT)
Dept: PULMONOLOGY | Age: 75
End: 2020-10-01

## 2020-10-05 NOTE — TELEPHONE ENCOUNTER
Spoke with Sydney Drake, he is faxing order for NIV and will get back with me on needed documentation. He has spoken with Mike Parada and will be following up at 3, 6, 9, 12 months and will forward downloads.

## 2020-10-21 NOTE — TELEPHONE ENCOUNTER
Vy Márquez called requesting a refill on the following medications:  Requested Prescriptions     Pending Prescriptions Disp Refills    fluticasone-salmeterol (ADVAIR) 250-50 MCG/DOSE AEPB 3 Inhaler 3     Sig: Inhale 1 puff into the lungs every 12 hours     Pharmacy verified: [facility] in [facility]  .       Date of last visit: 9/02/2020  Date of next visit (if applicable): Visit date not found

## 2020-12-03 ENCOUNTER — TELEPHONE (OUTPATIENT)
Dept: PULMONOLOGY | Age: 75
End: 2020-12-03

## 2021-02-01 NOTE — TELEPHONE ENCOUNTER
Patient needs just 1 inhaler sent to [facility] in [facility]. She is [facility] and will be out before it gets here.